# Patient Record
Sex: FEMALE | Race: WHITE | NOT HISPANIC OR LATINO
[De-identification: names, ages, dates, MRNs, and addresses within clinical notes are randomized per-mention and may not be internally consistent; named-entity substitution may affect disease eponyms.]

---

## 2020-08-12 PROBLEM — Z00.00 ENCOUNTER FOR PREVENTIVE HEALTH EXAMINATION: Status: ACTIVE | Noted: 2020-08-12

## 2020-08-18 ENCOUNTER — APPOINTMENT (OUTPATIENT)
Dept: PULMONOLOGY | Facility: CLINIC | Age: 65
End: 2020-08-18
Payer: COMMERCIAL

## 2020-08-18 VITALS
RESPIRATION RATE: 16 BRPM | TEMPERATURE: 97.4 F | DIASTOLIC BLOOD PRESSURE: 70 MMHG | HEART RATE: 84 BPM | WEIGHT: 248 LBS | OXYGEN SATURATION: 97 % | SYSTOLIC BLOOD PRESSURE: 110 MMHG | BODY MASS INDEX: 42.34 KG/M2 | HEIGHT: 64 IN

## 2020-08-18 DIAGNOSIS — I10 ESSENTIAL (PRIMARY) HYPERTENSION: ICD-10-CM

## 2020-08-18 DIAGNOSIS — E78.00 PURE HYPERCHOLESTEROLEMIA, UNSPECIFIED: ICD-10-CM

## 2020-08-18 PROCEDURE — 94727 GAS DIL/WSHOT DETER LNG VOL: CPT

## 2020-08-18 PROCEDURE — 94060 EVALUATION OF WHEEZING: CPT

## 2020-08-18 PROCEDURE — 94729 DIFFUSING CAPACITY: CPT

## 2020-08-18 PROCEDURE — 99203 OFFICE O/P NEW LOW 30 MIN: CPT | Mod: 25

## 2020-08-23 PROBLEM — E78.00 HYPERCHOLESTEROLEMIA: Status: ACTIVE | Noted: 2020-08-23

## 2020-08-23 PROBLEM — I10 HTN (HYPERTENSION): Status: ACTIVE | Noted: 2020-08-23

## 2020-08-23 RX ORDER — SIMVASTATIN 20 MG/1
20 TABLET, FILM COATED ORAL
Refills: 0 | Status: ACTIVE | COMMUNITY
Start: 2020-08-23

## 2020-08-23 RX ORDER — ALPRAZOLAM 0.25 MG/1
0.25 TABLET ORAL
Refills: 0 | Status: ACTIVE | COMMUNITY
Start: 2020-08-23

## 2020-08-23 RX ORDER — VALSARTAN 160 MG/1
160 TABLET, COATED ORAL
Refills: 0 | Status: ACTIVE | COMMUNITY
Start: 2020-08-23

## 2020-08-23 RX ORDER — METOPROLOL SUCCINATE 25 MG/1
25 TABLET, EXTENDED RELEASE ORAL
Refills: 0 | Status: ACTIVE | COMMUNITY
Start: 2020-08-23

## 2020-08-23 RX ORDER — MECLIZINE HYDROCHLORIDE 25 MG/1
25 TABLET ORAL
Refills: 0 | Status: ACTIVE | COMMUNITY
Start: 2020-08-23

## 2020-08-23 RX ORDER — BUDESONIDE AND FORMOTEROL FUMARATE DIHYDRATE 160; 4.5 UG/1; UG/1
160-4.5 AEROSOL RESPIRATORY (INHALATION)
Refills: 0 | Status: ACTIVE | COMMUNITY
Start: 2020-08-23

## 2020-08-23 RX ORDER — DUPILUMAB 300 MG/2ML
300 INJECTION, SOLUTION SUBCUTANEOUS
Refills: 0 | Status: ACTIVE | COMMUNITY
Start: 2020-08-23

## 2020-08-23 NOTE — PHYSICAL EXAM
[No Acute Distress] : no acute distress [Normal Oropharynx] : normal oropharynx [Normal Appearance] : normal appearance [No Neck Mass] : no neck mass [Normal S1, S2] : normal s1, s2 [Normal Rate/Rhythm] : normal rate/rhythm [No Murmurs] : no murmurs [Clear to Auscultation Bilaterally] : clear to auscultation bilaterally [No Resp Distress] : no resp distress [No Abnormalities] : no abnormalities [Benign] : benign [Normal Gait] : normal gait [No Edema] : no edema [No Clubbing] : no clubbing [No Cyanosis] : no cyanosis [Normal Color/ Pigmentation] : normal color/ pigmentation [FROM] : FROM [No Focal Deficits] : no focal deficits [Oriented x3] : oriented x3 [Normal Affect] : normal affect

## 2020-08-23 NOTE — HISTORY OF PRESENT ILLNESS
[Never] : never [TextBox_4] : 65 yo female presents for evaluation of PRN SOB associated with dry cough. The problems started after covid 19 infection four months ago. She was treated as an outpatient with zithromax, hydroxychloroquine and albuterol MDI. Symbicort was added by her PMD which she uses once daily without further albuterol use. Recent chest xray was essentially normal. The patient is on dupixent for eczema.

## 2020-08-23 NOTE — DISCUSSION/SUMMARY
[FreeTextEntry1] : 63 yo female with complaints likely related to post covid 19 infection. The patient is to continue symbicort as before with PRN albuterol . Treatment adjustment will depend on symptomatic needs. 2D echo cardiogram recommended. Diet and exercise also discussed. PFT with diffusion will be repeated in the future. She is to follow up with her PMD as before.

## 2021-02-25 ENCOUNTER — APPOINTMENT (OUTPATIENT)
Dept: PULMONOLOGY | Facility: CLINIC | Age: 66
End: 2021-02-25
Payer: COMMERCIAL

## 2021-02-25 VITALS
OXYGEN SATURATION: 97 % | WEIGHT: 255 LBS | HEART RATE: 105 BPM | TEMPERATURE: 98.6 F | BODY MASS INDEX: 43.77 KG/M2 | DIASTOLIC BLOOD PRESSURE: 88 MMHG | SYSTOLIC BLOOD PRESSURE: 118 MMHG

## 2021-02-25 PROCEDURE — 99213 OFFICE O/P EST LOW 20 MIN: CPT | Mod: 25

## 2021-02-25 PROCEDURE — 99072 ADDL SUPL MATRL&STAF TM PHE: CPT

## 2021-02-25 PROCEDURE — 94729 DIFFUSING CAPACITY: CPT

## 2021-02-25 PROCEDURE — 94060 EVALUATION OF WHEEZING: CPT

## 2021-02-25 PROCEDURE — 94727 GAS DIL/WSHOT DETER LNG VOL: CPT

## 2021-02-25 NOTE — PROCEDURE
[FreeTextEntry1] : PFT results: Limited effort. Normal spirometry and diffusion; unable to perform lung volumes

## 2021-02-25 NOTE — REASON FOR VISIT
[Follow-Up] : a follow-up visit [Shortness of Breath] : shortness of breath [TextBox_44] : post covid 19 infection

## 2021-02-25 NOTE — DISCUSSION/SUMMARY
[FreeTextEntry1] : 66 yo female with respiratory complaints in part due to airway hyperreactivity. She is to increase symbicort to two puffs BID with PRN albuterol. Treatment adjustment will depend on symptomatic needs. Of interest is the fact that she stopped dupixent use four months ago, which likely helped her pulmonary inflammation.I reviewed the PFT results with the patient.Spirometry and diffusion have normalized. Diet, weight loss and exercise discussed.She is to follow up with her PMD as before.

## 2021-02-25 NOTE — HISTORY OF PRESENT ILLNESS
[Never] : never [TextBox_4] : 64 yo female with hx of SOB post covid 19 infection, presents for follow up.The patient continues to complain of PRN SOB with PRN productive sputum. She denies chest pain or hemoptysis. She uses symbicort 160/4.5 one puff BID without albuterol use. The patient last used dupixent for rash four months ago. Since last visit, she was evaluated by cardiology with normal 2D echo. [TextBox_29] : Denies snoring, daytime somnolence, apneic episodes, AM headaches

## 2021-08-26 ENCOUNTER — APPOINTMENT (OUTPATIENT)
Dept: PULMONOLOGY | Facility: CLINIC | Age: 66
End: 2021-08-26
Payer: COMMERCIAL

## 2021-08-26 VITALS
TEMPERATURE: 98 F | WEIGHT: 260 LBS | OXYGEN SATURATION: 95 % | BODY MASS INDEX: 44.63 KG/M2 | HEART RATE: 107 BPM | SYSTOLIC BLOOD PRESSURE: 114 MMHG | DIASTOLIC BLOOD PRESSURE: 78 MMHG

## 2021-08-26 DIAGNOSIS — L20.9 ATOPIC DERMATITIS, UNSPECIFIED: ICD-10-CM

## 2021-08-26 PROCEDURE — 99213 OFFICE O/P EST LOW 20 MIN: CPT

## 2021-08-28 PROBLEM — L20.9 ATOPIC ECZEMA: Status: ACTIVE | Noted: 2020-08-23

## 2021-08-28 NOTE — DISCUSSION/SUMMARY
[FreeTextEntry1] : 64 yo female with complaints related to airway hyperreactivity. She is to use albuterol MDI PRN alone for now. Treatment adjustment will depend on symptomatic needs. PFT will be repeated in the future. Diet, weight loss and exercise also stressed. She is to follow up with her PMD as before.

## 2022-02-24 ENCOUNTER — APPOINTMENT (OUTPATIENT)
Dept: PULMONOLOGY | Facility: CLINIC | Age: 67
End: 2022-02-24
Payer: MEDICARE

## 2022-02-24 VITALS
SYSTOLIC BLOOD PRESSURE: 124 MMHG | OXYGEN SATURATION: 97 % | WEIGHT: 260 LBS | DIASTOLIC BLOOD PRESSURE: 74 MMHG | TEMPERATURE: 96.9 F | BODY MASS INDEX: 44.63 KG/M2 | HEART RATE: 99 BPM

## 2022-02-24 DIAGNOSIS — R05.8 OTHER SPECIFIED COUGH: ICD-10-CM

## 2022-02-24 PROCEDURE — 99214 OFFICE O/P EST MOD 30 MIN: CPT

## 2022-02-24 RX ORDER — ALBUTEROL SULFATE 90 UG/1
108 (90 BASE) INHALANT RESPIRATORY (INHALATION)
Qty: 1 | Refills: 1 | Status: ACTIVE | COMMUNITY
Start: 2020-08-23 | End: 1900-01-01

## 2022-03-19 PROBLEM — R05.8 DRY COUGH: Status: ACTIVE | Noted: 2020-08-23

## 2022-03-19 NOTE — HISTORY OF PRESENT ILLNESS
[Never] : never [TextBox_4] : 67 yo female with hx of hyperreactive airways disease presents for follow up. The patient complains of PRN dry cough without fever, chest pain or hemoptysis. She last used albuterol eight months ago, without symbicort or dupixent use for one year. The latter was prescribed by her allergist for a skin rash. She continues to state that she has not reached her baseline since covid 19 infection in April 2020. [TextBox_29] : Denies snoring, daytime somnolence, apneic episodes, AM headaches

## 2022-03-19 NOTE — DISCUSSION/SUMMARY
[FreeTextEntry1] : 65 yo female with OAD related complaints.She is to restart ICS/LABA use with PRN albuterol MDI. Treatment adjustment will depend on symptomatic needs. Use of biologic is not indicated at present. She is to follow up with her PMD.

## 2022-07-02 NOTE — HISTORY OF PRESENT ILLNESS
[Never] : never [TextBox_4] : 64 yo female with hx of hyperreactive airways disease, post covid 19 infection, presents for follow up. The patient complains of PRN DOMINGUEZ, worse in hot weather. She denies cough, chest pain or hemoptysis. She uses symbicort and albuterol both PRN, having stopped montelukast after ten days of use. She is "concerned" since having covid 19 infection last year.  She has used dupixent in the past for eczema, which she stopped over eight months ago, given resolution of skin abnormalities. [TextBox_29] : Denies snoring, daytime somnolence, apneic episodes, AM headaches 89

## 2022-08-25 ENCOUNTER — APPOINTMENT (OUTPATIENT)
Dept: PULMONOLOGY | Facility: CLINIC | Age: 67
End: 2022-08-25

## 2022-08-25 VITALS
WEIGHT: 256 LBS | TEMPERATURE: 96.4 F | DIASTOLIC BLOOD PRESSURE: 76 MMHG | BODY MASS INDEX: 43.94 KG/M2 | HEART RATE: 89 BPM | OXYGEN SATURATION: 97 % | SYSTOLIC BLOOD PRESSURE: 124 MMHG

## 2022-08-25 DIAGNOSIS — J45.909 UNSPECIFIED ASTHMA, UNCOMPLICATED: ICD-10-CM

## 2022-08-25 DIAGNOSIS — R06.00 DYSPNEA, UNSPECIFIED: ICD-10-CM

## 2022-08-25 PROCEDURE — 99214 OFFICE O/P EST MOD 30 MIN: CPT

## 2022-10-02 PROBLEM — R06.00 DOE (DYSPNEA ON EXERTION): Status: ACTIVE | Noted: 2020-08-23

## 2022-10-02 PROBLEM — J45.909 AIRWAY HYPERREACTIVITY: Status: ACTIVE | Noted: 2021-08-28

## 2022-10-02 NOTE — DISCUSSION/SUMMARY
[FreeTextEntry1] : 65 yo female with mild OAD with related complaints. PRN albuterol use as before recommended. Treatment adjustment will depend on symptomatic needs. PFT will be performed in the future. She is to follow up with her PMD as before.

## 2022-10-02 NOTE — HISTORY OF PRESENT ILLNESS
[Never] : never [TextBox_4] : 65 yo female with hx of hyperreactive airways disease presents for follow up. The patient complains of dry cough with PRN SOB without fever, chest pain or hemoptysis.She uses albuterol MDI PRN alone, last two months ago. [TextBox_29] : Denies snoring, daytime somnolence, apneic episodes, AM headaches

## 2022-10-26 ENCOUNTER — OUTPATIENT (OUTPATIENT)
Dept: OUTPATIENT SERVICES | Facility: HOSPITAL | Age: 67
LOS: 1 days | End: 2022-10-26
Payer: MEDICARE

## 2022-10-26 VITALS
HEIGHT: 64 IN | TEMPERATURE: 98 F | SYSTOLIC BLOOD PRESSURE: 131 MMHG | RESPIRATION RATE: 17 BRPM | OXYGEN SATURATION: 99 % | DIASTOLIC BLOOD PRESSURE: 86 MMHG | WEIGHT: 250 LBS | HEART RATE: 79 BPM

## 2022-10-26 DIAGNOSIS — I10 ESSENTIAL (PRIMARY) HYPERTENSION: ICD-10-CM

## 2022-10-26 DIAGNOSIS — E78.5 HYPERLIPIDEMIA, UNSPECIFIED: ICD-10-CM

## 2022-10-26 DIAGNOSIS — N95.0 POSTMENOPAUSAL BLEEDING: ICD-10-CM

## 2022-10-26 DIAGNOSIS — Z98.890 OTHER SPECIFIED POSTPROCEDURAL STATES: Chronic | ICD-10-CM

## 2022-10-26 DIAGNOSIS — Z01.818 ENCOUNTER FOR OTHER PREPROCEDURAL EXAMINATION: ICD-10-CM

## 2022-10-26 LAB — BLD GP AB SCN SERPL QL: SIGNIFICANT CHANGE UP

## 2022-10-26 PROCEDURE — 36415 COLL VENOUS BLD VENIPUNCTURE: CPT

## 2022-10-26 PROCEDURE — 86850 RBC ANTIBODY SCREEN: CPT

## 2022-10-26 PROCEDURE — 86901 BLOOD TYPING SEROLOGIC RH(D): CPT

## 2022-10-26 PROCEDURE — G0463: CPT

## 2022-10-26 PROCEDURE — 86900 BLOOD TYPING SEROLOGIC ABO: CPT

## 2022-10-26 NOTE — H&P PST ADULT - FALL HARM RISK - UNIVERSAL INTERVENTIONS
Bed in lowest position, wheels locked, appropriate side rails in place/Call bell, personal items and telephone in reach/Instruct patient to call for assistance before getting out of bed or chair/[place] to call system/Physically safe environment - no spills, clutter or unnecessary equipment/Purposeful Proactive Rounding/Room/bathroom lighting operational, light cord in reach

## 2022-10-26 NOTE — H&P PST ADULT - HISTORY OF PRESENT ILLNESS
66 yr old female HTN, HDL postmenopausal bleeding. Pt had spotting on and off for sometime seen by gyn and now in PST. Pt scheduled for Operative hysteroscopy D&C on 11/2/2022.

## 2022-10-26 NOTE — H&P PST ADULT - NSICDXFAMILYHX_GEN_ALL_CORE_FT
FAMILY HISTORY:  Father  Still living? No  FH: heart attack, Age at diagnosis: Age Unknown    Mother  Still living? No  FH: CHF (congestive heart failure), Age at diagnosis: Age Unknown

## 2022-10-26 NOTE — H&P PST ADULT - PROBLEM SELECTOR PLAN 3
Schedule for Operative Hysteroscopy D&C.      Pt instructed to be NPO the night before and morning of surgery except for BP meds. Provided with chlorhexidene4% solution to use 3 days including the day of surgery. Escort required. Written directions provided to refer to.    Stop Bang Score=2 Pt low risk for ENEDINA

## 2022-11-01 ENCOUNTER — TRANSCRIPTION ENCOUNTER (OUTPATIENT)
Age: 67
End: 2022-11-01

## 2022-11-02 ENCOUNTER — RESULT REVIEW (OUTPATIENT)
Age: 67
End: 2022-11-02

## 2022-11-02 ENCOUNTER — OUTPATIENT (OUTPATIENT)
Dept: OUTPATIENT SERVICES | Facility: HOSPITAL | Age: 67
LOS: 1 days | End: 2022-11-02
Payer: MEDICARE

## 2022-11-02 ENCOUNTER — TRANSCRIPTION ENCOUNTER (OUTPATIENT)
Age: 67
End: 2022-11-02

## 2022-11-02 VITALS
RESPIRATION RATE: 17 BRPM | HEART RATE: 79 BPM | TEMPERATURE: 98 F | DIASTOLIC BLOOD PRESSURE: 75 MMHG | SYSTOLIC BLOOD PRESSURE: 139 MMHG | OXYGEN SATURATION: 100 %

## 2022-11-02 VITALS
HEIGHT: 64 IN | OXYGEN SATURATION: 98 % | DIASTOLIC BLOOD PRESSURE: 78 MMHG | SYSTOLIC BLOOD PRESSURE: 109 MMHG | TEMPERATURE: 98 F | WEIGHT: 250 LBS | RESPIRATION RATE: 16 BRPM | HEART RATE: 102 BPM

## 2022-11-02 DIAGNOSIS — N95.0 POSTMENOPAUSAL BLEEDING: ICD-10-CM

## 2022-11-02 DIAGNOSIS — Z98.890 OTHER SPECIFIED POSTPROCEDURAL STATES: Chronic | ICD-10-CM

## 2022-11-02 DIAGNOSIS — Z01.818 ENCOUNTER FOR OTHER PREPROCEDURAL EXAMINATION: ICD-10-CM

## 2022-11-02 LAB
BASOPHILS # BLD AUTO: 0.03 K/UL — SIGNIFICANT CHANGE UP (ref 0–0.2)
BASOPHILS NFR BLD AUTO: 0.4 % — SIGNIFICANT CHANGE UP (ref 0–2)
BLD GP AB SCN SERPL QL: SIGNIFICANT CHANGE UP
EOSINOPHIL # BLD AUTO: 0.02 K/UL — SIGNIFICANT CHANGE UP (ref 0–0.5)
EOSINOPHIL NFR BLD AUTO: 0.2 % — SIGNIFICANT CHANGE UP (ref 0–6)
HCT VFR BLD CALC: 43.9 % — SIGNIFICANT CHANGE UP (ref 34.5–45)
HGB BLD-MCNC: 14.9 G/DL — SIGNIFICANT CHANGE UP (ref 11.5–15.5)
IMM GRANULOCYTES NFR BLD AUTO: 0.2 % — SIGNIFICANT CHANGE UP (ref 0–0.9)
LYMPHOCYTES # BLD AUTO: 1.45 K/UL — SIGNIFICANT CHANGE UP (ref 1–3.3)
LYMPHOCYTES # BLD AUTO: 17.9 % — SIGNIFICANT CHANGE UP (ref 13–44)
MCHC RBC-ENTMCNC: 29.2 PG — SIGNIFICANT CHANGE UP (ref 27–34)
MCHC RBC-ENTMCNC: 33.9 GM/DL — SIGNIFICANT CHANGE UP (ref 32–36)
MCV RBC AUTO: 85.9 FL — SIGNIFICANT CHANGE UP (ref 80–100)
MONOCYTES # BLD AUTO: 0.17 K/UL — SIGNIFICANT CHANGE UP (ref 0–0.9)
MONOCYTES NFR BLD AUTO: 2.1 % — SIGNIFICANT CHANGE UP (ref 2–14)
NEUTROPHILS # BLD AUTO: 6.39 K/UL — SIGNIFICANT CHANGE UP (ref 1.8–7.4)
NEUTROPHILS NFR BLD AUTO: 79.2 % — HIGH (ref 43–77)
NRBC # BLD: 0 /100 WBCS — SIGNIFICANT CHANGE UP (ref 0–0)
PLATELET # BLD AUTO: 292 K/UL — SIGNIFICANT CHANGE UP (ref 150–400)
RBC # BLD: 5.11 M/UL — SIGNIFICANT CHANGE UP (ref 3.8–5.2)
RBC # FLD: 13.8 % — SIGNIFICANT CHANGE UP (ref 10.3–14.5)
WBC # BLD: 8.08 K/UL — SIGNIFICANT CHANGE UP (ref 3.8–10.5)
WBC # FLD AUTO: 8.08 K/UL — SIGNIFICANT CHANGE UP (ref 3.8–10.5)

## 2022-11-02 PROCEDURE — 36415 COLL VENOUS BLD VENIPUNCTURE: CPT

## 2022-11-02 PROCEDURE — 58561 HYSTEROSCOPY REMOVE MYOMA: CPT

## 2022-11-02 PROCEDURE — 86850 RBC ANTIBODY SCREEN: CPT

## 2022-11-02 PROCEDURE — 88305 TISSUE EXAM BY PATHOLOGIST: CPT | Mod: 26

## 2022-11-02 PROCEDURE — 85025 COMPLETE CBC W/AUTO DIFF WBC: CPT

## 2022-11-02 PROCEDURE — 88305 TISSUE EXAM BY PATHOLOGIST: CPT

## 2022-11-02 PROCEDURE — 86901 BLOOD TYPING SEROLOGIC RH(D): CPT

## 2022-11-02 PROCEDURE — 86900 BLOOD TYPING SEROLOGIC ABO: CPT

## 2022-11-02 DEVICE — MYOSURE TISSUE REMOVAL FMS FOR FLUENT XL
Type: IMPLANTABLE DEVICE | Status: NON-FUNCTIONAL
Removed: 2022-11-02

## 2022-11-02 RX ORDER — SIMVASTATIN 20 MG/1
1 TABLET, FILM COATED ORAL
Qty: 0 | Refills: 0 | DISCHARGE

## 2022-11-02 RX ORDER — FENTANYL CITRATE 50 UG/ML
50 INJECTION INTRAVENOUS ONCE
Refills: 0 | Status: DISCONTINUED | OUTPATIENT
Start: 2022-11-02 | End: 2022-11-02

## 2022-11-02 RX ORDER — FENTANYL CITRATE 50 UG/ML
25 INJECTION INTRAVENOUS
Refills: 0 | Status: DISCONTINUED | OUTPATIENT
Start: 2022-11-02 | End: 2022-11-02

## 2022-11-02 RX ORDER — ACETAMINOPHEN 500 MG
2 TABLET ORAL
Qty: 40 | Refills: 1
Start: 2022-11-02 | End: 2022-11-11

## 2022-11-02 RX ORDER — VALSARTAN 80 MG/1
1 TABLET ORAL
Qty: 0 | Refills: 0 | DISCHARGE

## 2022-11-02 RX ORDER — METOPROLOL TARTRATE 50 MG
1 TABLET ORAL
Qty: 0 | Refills: 0 | DISCHARGE

## 2022-11-02 NOTE — ASU PREOP CHECKLIST - HAIR REMOVAL
hair removal not indicated Previous Accession (Optional): [id number] Previous Accession (Optional): [id number]

## 2022-11-02 NOTE — ASU DISCHARGE PLAN (ADULT/PEDIATRIC) - CARE PROVIDER_API CALL
Kaveh Bryan)  Obstetrics and Gynecology  [phone] [place] [address]  Phone: [phone]  Fax: [phone]  Established Patient  Follow Up Time: 2 weeks

## 2022-11-02 NOTE — PROGRESS NOTE ADULT - SUBJECTIVE AND OBJECTIVE BOX
dr foreman ordered cbc 1hr post surgery post hysteroscopy removal of polyps and myomas                           14.9   8.08  )-----------( 292      ( 02 Nov 2022 13:00 )             43.9     pt is s/p hysteroscopy removal of polyps and myomas  ebl in OR 100ml  pt seen in pacu   pt is alert   not in acute distress  pt offers no acute complaints  denies abd pain/cp/sob/palpitations  pt has not voided in the bathroom yet     bp: 127/64 HR 81  abd +BS soft Non distended no guarding no rebound no evidence of peritonitis pt comfortable during abd exam  pelvic no active vag bleeding noted on pad +staining  extrem b/l low: no edema non tender    -dw dr foreman: as per dr foreman pt is cleared for dc once parameters met-void to bathroom   -pt is notified -she is for dc home today

## 2022-11-02 NOTE — ASU PATIENT PROFILE, ADULT - FALL HARM RISK - UNIVERSAL INTERVENTIONS
Bed in lowest position, wheels locked, appropriate side rails in place/Call bell, personal items and telephone in reach/Instruct patient to call for assistance before getting out of bed or chair/Non-slip footwear when patient is out of bed/[place] to call system/Physically safe environment - no spills, clutter or unnecessary equipment/Purposeful Proactive Rounding/Room/bathroom lighting operational, light cord in reach

## 2022-11-02 NOTE — ASU DISCHARGE PLAN (ADULT/PEDIATRIC) - NS MD DC FALL RISK RISK
For information on Fall & Injury Prevention, visit: [email or web address] OR  [email or web address] OR  https://www.cdc.gov/steadi/patient.html

## 2022-11-02 NOTE — ASU DISCHARGE PLAN (ADULT/PEDIATRIC) - ACTIVITY LEVEL
No excercise/No heavy lifting/Weight bearing as tolerated/Nothing per rectum/Nothing per vagina/No tub baths/No douching/No tampons/No intercourse No excercise/No heavy lifting/No sports/gym/Weight bearing as tolerated/Nothing per rectum/Nothing per vagina/No tub baths/No douching/No tampons/No intercourse

## 2022-11-04 LAB — SURGICAL PATHOLOGY STUDY: SIGNIFICANT CHANGE UP

## (undated) DEVICE — LAP PAD W RING 18 X 18"

## (undated) DEVICE — TUBING TUR 2 PRONG

## (undated) DEVICE — Device

## (undated) DEVICE — WARMING BLANKET UPPER ADULT

## (undated) DEVICE — DRAPE LEGGINGS 6" CUFF 28X43"

## (undated) DEVICE — SOL IRR POUR NS 0.9% 1500ML

## (undated) DEVICE — GOWN XL

## (undated) DEVICE — BASIN SET SINGLE

## (undated) DEVICE — ELCTR CUTTING LOOP MONOPOLAR ANGLED 24F

## (undated) DEVICE — VENODYNE/SCD SLEEVE CALF MEDIUM

## (undated) DEVICE — DRAPE LIGHT HANDLE COVER (BLUE)

## (undated) DEVICE — SOL IRR SORBITOL 3% 3000ML

## (undated) DEVICE — SOL IRR GLYCINE 1.5% 3000L

## (undated) DEVICE — TUBING SET TUR BLADDER IRRIGATION Y-TYPE 81"